# Patient Record
Sex: MALE | Race: WHITE | NOT HISPANIC OR LATINO | URBAN - METROPOLITAN AREA
[De-identification: names, ages, dates, MRNs, and addresses within clinical notes are randomized per-mention and may not be internally consistent; named-entity substitution may affect disease eponyms.]

---

## 2023-03-09 ENCOUNTER — OFFICE (OUTPATIENT)
Dept: URBAN - METROPOLITAN AREA CLINIC 104 | Facility: CLINIC | Age: 53
End: 2023-03-09

## 2023-03-09 VITALS
DIASTOLIC BLOOD PRESSURE: 89 MMHG | HEART RATE: 76 BPM | OXYGEN SATURATION: 97 % | SYSTOLIC BLOOD PRESSURE: 171 MMHG | HEIGHT: 71 IN | WEIGHT: 178 LBS

## 2023-03-09 DIAGNOSIS — K59.00 CONSTIPATION, UNSPECIFIED: ICD-10-CM

## 2023-03-09 PROCEDURE — 99203 OFFICE O/P NEW LOW 30 MIN: CPT | Performed by: REGISTERED NURSE

## 2023-03-09 NOTE — SERVICENOTES
I did mention getting a x-ray to the patient to evaluate for any signs of fecal stasis or obstruction, but he reports that he is on a fixed income and does not have enough money to pay for multiple doctor visits along with procedures and imaging.  Since he reports that he has been having a bowel movement every day since Tuesday I will hold off on ordering the x-ray.  I have asked that he notify me if he starts having less frequent bowel movements.

## 2023-03-09 NOTE — SERVICEHPINOTES
Mr. Gabriel is a 52-year-old male that I am seeing today for a first visit.  he was referred by his pain management provider for constipation.  He is a past medical history of COPD, DVT, hepatic steatosis, hypertension and multiple back surgeries. he reports that he had a "Pain Block" on 2/27/23 and experienced some constipation afterwards. He states the he had a bowel movement the day of his pain block and 2 days after, but then went 5 days without a bowel movement. During this time he states that he was not eating his normal diet or drinking water. He has since returned to his normal diet and is drinking more water and has had several bowel movements. He has had at least one bowel movement a day since Tuesday. He does not take any medications for constipation. He is unsure which medications he is taking and did not bring in a list with him today.  He denies having any nausea, vomiting, dysphagia, epigastric, heartburn or regurgitation. He is not having any abdominal pain. He denies having any hematochezia or melena.
br
 For colon cancer screening, he reports that he just received a Cologuard collection kit in the mail and will be submitting this. He is not have any family history of colon cancer inflammatory bowel disease.